# Patient Record
Sex: FEMALE | Race: WHITE | NOT HISPANIC OR LATINO
[De-identification: names, ages, dates, MRNs, and addresses within clinical notes are randomized per-mention and may not be internally consistent; named-entity substitution may affect disease eponyms.]

---

## 2017-07-31 PROBLEM — Z00.00 ENCOUNTER FOR PREVENTIVE HEALTH EXAMINATION: Status: ACTIVE | Noted: 2017-07-31

## 2017-08-02 ENCOUNTER — APPOINTMENT (OUTPATIENT)
Dept: ULTRASOUND IMAGING | Facility: CLINIC | Age: 32
End: 2017-08-02
Payer: COMMERCIAL

## 2017-08-02 ENCOUNTER — OUTPATIENT (OUTPATIENT)
Dept: OUTPATIENT SERVICES | Facility: HOSPITAL | Age: 32
LOS: 1 days | End: 2017-08-02

## 2017-08-02 PROCEDURE — 76830 TRANSVAGINAL US NON-OB: CPT | Mod: 26

## 2017-09-27 ENCOUNTER — APPOINTMENT (OUTPATIENT)
Dept: ULTRASOUND IMAGING | Facility: CLINIC | Age: 32
End: 2017-09-27
Payer: COMMERCIAL

## 2017-09-27 ENCOUNTER — OUTPATIENT (OUTPATIENT)
Dept: OUTPATIENT SERVICES | Facility: HOSPITAL | Age: 32
LOS: 1 days | End: 2017-09-27

## 2017-09-27 PROCEDURE — 76830 TRANSVAGINAL US NON-OB: CPT | Mod: 26

## 2018-02-26 ENCOUNTER — APPOINTMENT (OUTPATIENT)
Dept: ORTHOPEDIC SURGERY | Facility: CLINIC | Age: 33
End: 2018-02-26
Payer: COMMERCIAL

## 2018-02-26 VITALS — SYSTOLIC BLOOD PRESSURE: 111 MMHG | HEIGHT: 62.2 IN | DIASTOLIC BLOOD PRESSURE: 75 MMHG | HEART RATE: 45 BPM

## 2018-02-26 DIAGNOSIS — Z78.9 OTHER SPECIFIED HEALTH STATUS: ICD-10-CM

## 2018-02-26 DIAGNOSIS — Z80.3 FAMILY HISTORY OF MALIGNANT NEOPLASM OF BREAST: ICD-10-CM

## 2018-02-26 DIAGNOSIS — Z82.61 FAMILY HISTORY OF ARTHRITIS: ICD-10-CM

## 2018-02-26 PROCEDURE — 73564 X-RAY EXAM KNEE 4 OR MORE: CPT | Mod: RT

## 2018-02-26 PROCEDURE — 99204 OFFICE O/P NEW MOD 45 MIN: CPT

## 2018-02-26 PROCEDURE — 73560 X-RAY EXAM OF KNEE 1 OR 2: CPT | Mod: LT

## 2018-02-26 RX ORDER — B-COMPLEX WITH VITAMIN C
TABLET ORAL
Refills: 0 | Status: ACTIVE | COMMUNITY

## 2018-03-13 ENCOUNTER — FORM ENCOUNTER (OUTPATIENT)
Age: 33
End: 2018-03-13

## 2018-03-14 ENCOUNTER — OUTPATIENT (OUTPATIENT)
Dept: OUTPATIENT SERVICES | Facility: HOSPITAL | Age: 33
LOS: 1 days | End: 2018-03-14

## 2018-03-14 ENCOUNTER — APPOINTMENT (OUTPATIENT)
Dept: MRI IMAGING | Facility: CLINIC | Age: 33
End: 2018-03-14
Payer: COMMERCIAL

## 2018-03-14 PROCEDURE — 73721 MRI JNT OF LWR EXTRE W/O DYE: CPT | Mod: 26,RT

## 2018-03-16 ENCOUNTER — RESULT REVIEW (OUTPATIENT)
Age: 33
End: 2018-03-16

## 2018-04-04 ENCOUNTER — APPOINTMENT (OUTPATIENT)
Dept: ORTHOPEDIC SURGERY | Facility: CLINIC | Age: 33
End: 2018-04-04
Payer: COMMERCIAL

## 2018-04-04 PROCEDURE — 99214 OFFICE O/P EST MOD 30 MIN: CPT

## 2018-07-24 ENCOUNTER — APPOINTMENT (OUTPATIENT)
Dept: ORTHOPEDIC SURGERY | Facility: CLINIC | Age: 33
End: 2018-07-24
Payer: COMMERCIAL

## 2018-07-24 PROCEDURE — 99214 OFFICE O/P EST MOD 30 MIN: CPT

## 2019-02-18 PROBLEM — S83.8X1A ACUTE MEDIAL MENISCAL INJURY OF RIGHT KNEE, INITIAL ENCOUNTER: Status: ACTIVE | Noted: 2019-02-18

## 2019-02-18 PROBLEM — S83.281A ACUTE LATERAL MENISCUS TEAR OF RIGHT KNEE, INITIAL ENCOUNTER: Status: ACTIVE | Noted: 2019-02-18

## 2019-02-18 PROBLEM — M23.8X2 ACL LAXITY, LEFT: Status: ACTIVE | Noted: 2018-02-26

## 2019-02-18 PROBLEM — T84.89XD TEAR OF ANTERIOR CRUCIATE LIGAMENT GRAFT, SUBSEQUENT ENCOUNTER: Status: ACTIVE | Noted: 2018-03-15

## 2019-02-19 ENCOUNTER — APPOINTMENT (OUTPATIENT)
Dept: ORTHOPEDIC SURGERY | Facility: CLINIC | Age: 34
End: 2019-02-19
Payer: COMMERCIAL

## 2019-02-19 DIAGNOSIS — M23.8X2 OTHER INTERNAL DERANGEMENTS OF LEFT KNEE: ICD-10-CM

## 2019-02-19 DIAGNOSIS — S83.281A OTHER TEAR OF LATERAL MENISCUS, CURRENT INJURY, RIGHT KNEE, INITIAL ENCOUNTER: ICD-10-CM

## 2019-02-19 DIAGNOSIS — S83.8X1A SPRAIN OF OTHER SPECIFIED PARTS OF RIGHT KNEE, INITIAL ENCOUNTER: ICD-10-CM

## 2019-02-19 DIAGNOSIS — T84.89XD OTHER SPECIFIED COMPLICATION OF INTERNAL ORTHOPEDIC PROSTHETIC DEVICES, IMPLANTS AND GRAFTS, SUBSEQUENT ENCOUNTER: ICD-10-CM

## 2019-02-19 PROCEDURE — 73562 X-RAY EXAM OF KNEE 3: CPT | Mod: LT

## 2019-02-19 PROCEDURE — 99215 OFFICE O/P EST HI 40 MIN: CPT

## 2019-02-19 NOTE — ASSESSMENT
[FreeTextEntry1] : 32 yo s/p bilateral failed ACL-R with hamstring autograft with more symptoms right knee. She had an injury to her RIGHT knee about a year ago and since then it feels more unstable. The MRI did confirm failure of the ACL graft as well as the medial and lateral meniscal tears. The meniscus tears are not particularly symptomatic which is why we did not operate immediately. She has tried nonoperative treatment but feels that her knee is not stable and feels limited and would like to be more active. The LEFT knee gets some discomfort but no real sense of instability at this time. On exam I don't feel a good endpoint on the ACL and it does feel quite lax. We will get an MRI of her LEFT knee which we had done to see if the graft failed on the LEFT as well.\par We did talk about revision ACL reconstruction today in more detail. We talked about graft options which would be cadaver versus patellar tendon. I lean towards doing a patella tendon because it incorporates better although allograft can have its benefits which we discussed as well. She will think about it and let me know.\par Her tunnel in the femur appears somewhat vertical so I feel we could probably just to make a tunnel lower in the wall through an anteromedial portal.\par Revisions obviously her more challenging and healing can be slower and there is a risk of failure.\par We discussed other risks as well.\par Risks include but are not limited to infection, DVT, hardware issues, knee stiffness or laxity, recurrent tears and anesthetic complications.\par She would need preoperative medical clearance.\par We discussed post op recovery.\par Surgery will be scheduled at her convenience

## 2019-02-19 NOTE — HISTORY OF PRESENT ILLNESS
[de-identified] : Sarah comes in for f/u for her knees, s/p ACL-R many yrs ago with recurrent tears / insufficiency.\par She hasn't had any gross instability or buckling since I saw her. She gets some intermittent mild pain \par The right knee can feel unstable even though it hasn't buckled.She has really limited her activities and would like to ski and play sports and be more active and not worry about her knee so much.\par She is thinking that she would want to do surgery. \par The left knee hurts more than the right. Pain is anterior lateral. It is usually there in the morning and then is better after walking.\par She hasn't been doing her exercises regularly.

## 2019-02-19 NOTE — PHYSICAL EXAM
[LE] : Sensory: Intact in bilateral lower extremities [DP] : dorsalis pedis 2+ and symmetric bilaterally [PT] : posterior tibial 2+ and symmetric bilaterally [Normal RLE] : Right Lower Extremity: No scars, rashes, lesions, ulcers, skin intact [Normal LLE] : Left Lower Extremity: No scars, rashes, lesions, ulcers, skin intact [Normal Touch] : sensation intact for touch [Normal] : No swelling, no edema, normal pedal pulses and normal temperature [de-identified] : Knees:\par Nonantalgic gait.\par Multiple well-healed incisions\par - effusion.\par - erythema, edema, warmth.\par Tender LEFT knee anteromedial joint line and patella. No significant RIGHT knee tenderness\par ROM: 0 degrees extension to 135 degrees flexion. - crepitus\par - Reinaldo.\par 2B Lachman.  1+ Pivot shift bilateral knees. - posterior drawer. Normal rotational, varus/valgus laxity.\par Intact extensor mechanism.\par NVI distally.\par  [de-identified] : \par X-rays bilateral knees weightbearing 3 views today show a staple in the tibia bilaterally and screw in the femur fixing the graft. The RIGHT femoral tunnel is more vertical than the LEFT. No significant osteoarthritis is seen

## 2019-03-15 ENCOUNTER — OUTPATIENT (OUTPATIENT)
Dept: OUTPATIENT SERVICES | Facility: HOSPITAL | Age: 34
LOS: 1 days | End: 2019-03-15

## 2019-03-15 ENCOUNTER — APPOINTMENT (OUTPATIENT)
Age: 34
End: 2019-03-15

## 2019-03-15 DIAGNOSIS — T84.89XA OTHER SPECIFIED COMPLICATION OF INTERNAL ORTHOPEDIC PROSTHETIC DEVICES, IMPLANTS AND GRAFTS, INITIAL ENCOUNTER: ICD-10-CM

## 2019-03-15 DIAGNOSIS — M23.8X1 OTHER INTERNAL DERANGEMENTS OF RIGHT KNEE: ICD-10-CM

## 2019-03-15 DIAGNOSIS — S83.281A OTHER TEAR OF LATERAL MENISCUS, CURRENT INJURY, RIGHT KNEE, INITIAL ENCOUNTER: ICD-10-CM

## 2019-03-15 DIAGNOSIS — Z01.818 ENCOUNTER FOR OTHER PREPROCEDURAL EXAMINATION: ICD-10-CM

## 2019-03-15 DIAGNOSIS — S83.8X1A SPRAIN OF OTHER SPECIFIED PARTS OF RIGHT KNEE, INITIAL ENCOUNTER: ICD-10-CM

## 2019-03-15 LAB
ALBUMIN SERPL ELPH-MCNC: 4.7 G/DL — SIGNIFICANT CHANGE UP (ref 3.3–5)
ALP SERPL-CCNC: 75 U/L — SIGNIFICANT CHANGE UP (ref 40–120)
ALT FLD-CCNC: 16 U/L — SIGNIFICANT CHANGE UP (ref 10–45)
ANION GAP SERPL CALC-SCNC: 15 MMOL/L — SIGNIFICANT CHANGE UP (ref 5–17)
APTT BLD: 34.4 SEC — SIGNIFICANT CHANGE UP (ref 27.5–36.3)
AST SERPL-CCNC: 20 U/L — SIGNIFICANT CHANGE UP (ref 10–40)
BILIRUB SERPL-MCNC: 0.3 MG/DL — SIGNIFICANT CHANGE UP (ref 0.2–1.2)
BUN SERPL-MCNC: 11 MG/DL — SIGNIFICANT CHANGE UP (ref 7–23)
CALCIUM SERPL-MCNC: 9.4 MG/DL — SIGNIFICANT CHANGE UP (ref 8.4–10.5)
CHLORIDE SERPL-SCNC: 103 MMOL/L — SIGNIFICANT CHANGE UP (ref 96–108)
CO2 SERPL-SCNC: 23 MMOL/L — SIGNIFICANT CHANGE UP (ref 22–31)
CREAT SERPL-MCNC: 0.59 MG/DL — SIGNIFICANT CHANGE UP (ref 0.5–1.3)
GLUCOSE SERPL-MCNC: 88 MG/DL — SIGNIFICANT CHANGE UP (ref 70–99)
HCG SERPL-ACNC: <.1 MIU/ML — SIGNIFICANT CHANGE UP
HCT VFR BLD CALC: 38.2 % — SIGNIFICANT CHANGE UP (ref 34.5–45)
HGB BLD-MCNC: 11.6 G/DL — SIGNIFICANT CHANGE UP (ref 11.5–15.5)
INR BLD: 1.08 — SIGNIFICANT CHANGE UP (ref 0.88–1.16)
MCHC RBC-ENTMCNC: 25.1 PG — LOW (ref 27–34)
MCHC RBC-ENTMCNC: 30.4 GM/DL — LOW (ref 32–36)
MCV RBC AUTO: 82.7 FL — SIGNIFICANT CHANGE UP (ref 80–100)
NRBC # BLD: 0 /100 WBCS — SIGNIFICANT CHANGE UP (ref 0–0)
PLATELET # BLD AUTO: 359 K/UL — SIGNIFICANT CHANGE UP (ref 150–400)
POTASSIUM SERPL-MCNC: 4 MMOL/L — SIGNIFICANT CHANGE UP (ref 3.5–5.3)
POTASSIUM SERPL-SCNC: 4 MMOL/L — SIGNIFICANT CHANGE UP (ref 3.5–5.3)
PROT SERPL-MCNC: 8 G/DL — SIGNIFICANT CHANGE UP (ref 6–8.3)
PROTHROM AB SERPL-ACNC: 12.2 SEC — SIGNIFICANT CHANGE UP (ref 10–12.9)
RBC # BLD: 4.62 M/UL — SIGNIFICANT CHANGE UP (ref 3.8–5.2)
RBC # FLD: 13.9 % — SIGNIFICANT CHANGE UP (ref 10.3–14.5)
SODIUM SERPL-SCNC: 141 MMOL/L — SIGNIFICANT CHANGE UP (ref 135–145)
WBC # BLD: 6.13 K/UL — SIGNIFICANT CHANGE UP (ref 3.8–10.5)
WBC # FLD AUTO: 6.13 K/UL — SIGNIFICANT CHANGE UP (ref 3.8–10.5)

## 2019-04-03 ENCOUNTER — RX RENEWAL (OUTPATIENT)
Age: 34
End: 2019-04-03

## 2019-04-04 ENCOUNTER — OUTPATIENT (OUTPATIENT)
Dept: OUTPATIENT SERVICES | Facility: HOSPITAL | Age: 34
LOS: 1 days | Discharge: HOME | End: 2019-04-04
Payer: COMMERCIAL

## 2019-04-04 ENCOUNTER — APPOINTMENT (OUTPATIENT)
Dept: ORTHOPEDIC SURGERY | Facility: AMBULATORY SURGERY CENTER | Age: 34
End: 2019-04-04

## 2019-04-04 DIAGNOSIS — Z96.9 PRESENCE OF FUNCTIONAL IMPLANT, UNSPECIFIED: ICD-10-CM

## 2019-04-04 PROCEDURE — 29888 ARTHRS AID ACL RPR/AGMNTJ: CPT | Mod: RT

## 2019-04-04 PROCEDURE — 29881 ARTHRS KNE SRG MNISECTMY M/L: CPT | Mod: 59,RT

## 2019-04-04 PROCEDURE — 20680 REMOVAL OF IMPLANT DEEP: CPT | Mod: 59,RT

## 2019-04-12 ENCOUNTER — APPOINTMENT (OUTPATIENT)
Dept: ORTHOPEDIC SURGERY | Facility: CLINIC | Age: 34
End: 2019-04-12
Payer: COMMERCIAL

## 2019-04-12 PROCEDURE — 99024 POSTOP FOLLOW-UP VISIT: CPT

## 2019-04-12 NOTE — HISTORY OF PRESENT ILLNESS
[___ Days Post Op] : post op day #[unfilled] [Procedure: ___] : status post [unfilled] [Indication: ___] : for [unfilled] [Clean/Dry/Intact] : clean, dry and intact [Swelling] : swollen [Neuro Intact] : an unremarkable neurological exam [Vascular Intact] : ~T peripheral vascular exam normal [Negative Rivka's] : maneuvers demonstrated a negative Rivka's sign [Sutures Removed] : sutures were removed [Chills] : no chills [Fever] : no fever [Erythema] : not erythematous [Dehiscence] : not dehisced [Adequate Pain Control] : has adequate pain control [No Sign of Infection] : is showing no signs of infection [Doing Well] : is doing well [de-identified] : RIGHT knee\par Edema and ecchymoses along the tibial shin. No erythema.\par Dry blood on the dressing but no drainage.\par Knee motion is zero to about 45° flexion.\par She walks well partial weightbearing\par Her quadriceps is firing. [Steri-Strips Removed & Replaced] : steri-strips removed and replaced [de-identified] : No complaints. She stopped taking the Percocet on Sunday and is just taking Tylenol as needed. She is walking partial weightbearing with the brace and crutches. She stands up quad tightening and limited knee motion [de-identified] : Physical therapy prescription was given. Basically we'll be a standard ACL reconstruction protocol but perhaps slowing down by a couple weeks given that this is a reconstruction and therefore going a little bit slower with the rehabilitation.\par She can do heel sides and work on the range of motion. Partial weightbearing progressing towards full weightbearing with the crutches and brace as tolerated. Once she has good quadriceps function she can unlock the brace for ambulation. She will use the brace for a total of 6 weeks. Elevate and ice.\par She can start the physical therapy next week\par Followup in 1 mo

## 2019-05-10 ENCOUNTER — APPOINTMENT (OUTPATIENT)
Dept: ORTHOPEDIC SURGERY | Facility: CLINIC | Age: 34
End: 2019-05-10
Payer: COMMERCIAL

## 2019-05-10 PROCEDURE — 99024 POSTOP FOLLOW-UP VISIT: CPT

## 2019-05-10 RX ORDER — OXYCODONE AND ACETAMINOPHEN 5; 325 MG/1; MG/1
5-325 TABLET ORAL
Qty: 15 | Refills: 0 | Status: COMPLETED | COMMUNITY
Start: 2019-04-03 | End: 2019-05-10

## 2019-05-10 RX ORDER — OXYCODONE AND ACETAMINOPHEN 5; 325 MG/1; MG/1
5-325 TABLET ORAL
Qty: 20 | Refills: 0 | Status: COMPLETED | COMMUNITY
Start: 2019-04-03 | End: 2019-05-10

## 2019-05-10 NOTE — HISTORY OF PRESENT ILLNESS
[Procedure: ___] : status post [unfilled] [___ Weeks Post Op] : [unfilled] weeks post op [Healed] : healed [Neuro Intact] : an unremarkable neurological exam [Vascular Intact] : ~T peripheral vascular exam normal [Negative Rivka's] : maneuvers demonstrated a negative Rivka's sign [3] : the patient reports pain that is 3/10 in severity [Doing Well] : is doing well [Excellent Pain Control] : has excellent pain control [No Sign of Infection] : is showing no signs of infection [Chills] : no chills [Erythema] : not erythematous [Fever] : no fever [Dehiscence] : not dehisced [Discharge] : absent of discharge [de-identified] : Knee is stiff in am but no significant pain.\par Is not taking any pain medication at all. She's been going to physical therapy mostly 3 times a week. She does home exercises. She has no pain or difficulty walking and would like to get rid of the brace. [de-identified] : Right knee \par ROM 0-105 degrees flexion.\par 1 A lachman\par Mild to moderate quad atrophy. Intact SLR\par NVI \par possible early suture abscess. Sutures not seen and there is no exudate but it is slightly red and raised. The area was cleaned and antibiotic ointment and a Band-Aid was applied. She should keep an eye on it and if there is any discharge or persistent redness or increasing redness she should let me know immediately.\par She walks very well. Very slight flexion contracture compared to the LEFT knee [de-identified] : Continue PT and home exercises per ACL protocol. \par WBAT. Brace for 1 more week.She has a more functional knee brace that she can use instead of the postop knee brace which is more cumbers. We discussed postop protocol\par F/U in 6 wks

## 2019-06-17 ENCOUNTER — APPOINTMENT (OUTPATIENT)
Dept: ORTHOPEDIC SURGERY | Facility: CLINIC | Age: 34
End: 2019-06-17
Payer: COMMERCIAL

## 2019-06-17 DIAGNOSIS — T84.89XD OTHER SPECIFIED COMPLICATION OF INTERNAL ORTHOPEDIC PROSTHETIC DEVICES, IMPLANTS AND GRAFTS, SUBSEQUENT ENCOUNTER: ICD-10-CM

## 2019-06-17 DIAGNOSIS — S83.511A SPRAIN OF ANTERIOR CRUCIATE LIGAMENT OF RIGHT KNEE, INITIAL ENCOUNTER: ICD-10-CM

## 2019-06-17 DIAGNOSIS — S83.241D OTHER TEAR OF MEDIAL MENISCUS, CURRENT INJURY, RIGHT KNEE, SUBSEQUENT ENCOUNTER: ICD-10-CM

## 2019-06-17 DIAGNOSIS — M23.8X1 OTHER INTERNAL DERANGEMENTS OF RIGHT KNEE: ICD-10-CM

## 2019-06-17 PROCEDURE — 99024 POSTOP FOLLOW-UP VISIT: CPT

## 2019-06-17 NOTE — HISTORY OF PRESENT ILLNESS
[Procedure: ___] : status post [unfilled] [___ Months Post Op] : [unfilled] months post op [Healed] : healed [Neuro Intact] : an unremarkable neurological exam [Negative Rivka's] : maneuvers demonstrated a negative Rivka's sign [Vascular Intact] : ~T peripheral vascular exam normal [No Sign of Infection] : is showing no signs of infection [Adequate Pain Control] : has adequate pain control [Chills] : no chills [Fever] : no fever [Swelling] : not swollen [Erythema] : not erythematous [Dehiscence] : not dehisced [de-identified] : Sarah has been going to physical therapy and doing exercises.\par She gets intermittent pain anterior knee.\par No limping. She doesn't take any medication at all for pain. No swelling. She can walk up and down stairs normally now. She rides a bike. She has been applying the silicone gel to the incision which helps. [de-identified] : RIGHT knee\par Range of motion 0-125° flexion.She lacks a few degrees of hyperextension and about 5-10° flexion\par 1A Lachman\par Tender patellar facets and mildly anterior medial joint line. No palpable or prominent hardware.\par Normal neurovascular exam distally.\par Quadriceps atrophy but improving.\par Tender anterior joint line.  [de-identified] : Improving following revision ACL reconstruction. [de-identified] : Continue physical therapy as per ACL protocol with progressive strengthening.She should work on getting back range of motion which is relatively good lacking more flexion than extension. No pivoting or twisting on the knee. She may swim but no breaststroke kick. She shouldn't go in the ocean. No running yet. She may ride a stationary bike.\par Followup in about 6-8 weeks

## 2019-08-09 PROBLEM — Z98.890 S/P ACL RECONSTRUCTION: Status: ACTIVE | Noted: 2018-02-26

## 2019-08-09 NOTE — HISTORY OF PRESENT ILLNESS
[de-identified] : Sarah comes in for followup status post revision RIGHT ACL reconstruction a little over 4 months ago.\par The RIGHT knee is feeling

## 2019-08-09 NOTE — DISCUSSION/SUMMARY
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

## 2019-08-13 ENCOUNTER — APPOINTMENT (OUTPATIENT)
Dept: ORTHOPEDIC SURGERY | Facility: CLINIC | Age: 34
End: 2019-08-13

## 2019-08-13 DIAGNOSIS — Z98.890 OTHER SPECIFIED POSTPROCEDURAL STATES: ICD-10-CM

## 2021-04-06 ENCOUNTER — APPOINTMENT (OUTPATIENT)
Age: 36
End: 2021-04-06

## 2021-04-27 ENCOUNTER — APPOINTMENT (OUTPATIENT)
Age: 36
End: 2021-04-27